# Patient Record
Sex: MALE | URBAN - METROPOLITAN AREA
[De-identification: names, ages, dates, MRNs, and addresses within clinical notes are randomized per-mention and may not be internally consistent; named-entity substitution may affect disease eponyms.]

---

## 2022-12-08 LAB — HBA1C MFR BLD HPLC: 10.6 %

## 2022-12-09 ENCOUNTER — TELEPHONE (OUTPATIENT)
Dept: ENDOCRINOLOGY | Age: 27
End: 2022-12-09

## 2023-03-22 VITALS
HEART RATE: 100 BPM | DIASTOLIC BLOOD PRESSURE: 78 MMHG | SYSTOLIC BLOOD PRESSURE: 122 MMHG | HEIGHT: 74 IN | BODY MASS INDEX: 32.47 KG/M2 | OXYGEN SATURATION: 96 % | WEIGHT: 253 LBS

## 2023-03-22 DIAGNOSIS — E66.9 CLASS 1 OBESITY: ICD-10-CM

## 2023-03-22 DIAGNOSIS — E10.9 TYPE 1 DIABETES MELLITUS WITHOUT COMPLICATIONS (HCC): Primary | ICD-10-CM

## 2023-03-22 PROBLEM — E66.811 CLASS 1 OBESITY: Status: ACTIVE | Noted: 2023-03-22

## 2023-03-22 RX ORDER — INSULIN GLARGINE 100 [IU]/ML
INJECTION, SOLUTION SUBCUTANEOUS
COMMUNITY
Start: 2023-02-09

## 2023-03-22 RX ORDER — INSULIN LISPRO 100 [IU]/ML
INJECTION, SOLUTION INTRAVENOUS; SUBCUTANEOUS
COMMUNITY
Start: 2023-01-31

## 2023-04-11 PROBLEM — E66.01 TYPE 1 DIABETES MELLITUS WITH MORBID OBESITY (HCC): Status: ACTIVE | Noted: 2023-04-11

## 2023-04-11 PROBLEM — E10.65 TYPE 1 DIABETES MELLITUS WITH HYPERGLYCEMIA (HCC): Status: ACTIVE | Noted: 2023-04-11

## 2023-04-11 PROBLEM — E10.69 TYPE 1 DIABETES MELLITUS WITH MORBID OBESITY (HCC): Status: ACTIVE | Noted: 2023-04-11

## 2023-04-11 PROBLEM — E10.69 DYSLIPIDEMIA DUE TO TYPE 1 DIABETES MELLITUS (HCC): Status: ACTIVE | Noted: 2023-04-11

## 2023-04-11 PROBLEM — E78.5 DYSLIPIDEMIA DUE TO TYPE 1 DIABETES MELLITUS (HCC): Status: ACTIVE | Noted: 2023-04-11

## 2023-05-05 LAB — DIABETIC RETINOPATHY: NEGATIVE

## 2023-05-08 PROBLEM — R03.0 ELEVATED BLOOD PRESSURE READING WITHOUT DIAGNOSIS OF HYPERTENSION: Status: ACTIVE | Noted: 2021-04-19

## 2023-05-09 ENCOUNTER — OFFICE VISIT (OUTPATIENT)
Dept: ENDOCRINOLOGY | Age: 28
End: 2023-05-09
Payer: COMMERCIAL

## 2023-05-09 VITALS
HEART RATE: 72 BPM | HEIGHT: 74 IN | BODY MASS INDEX: 31.57 KG/M2 | OXYGEN SATURATION: 97 % | DIASTOLIC BLOOD PRESSURE: 74 MMHG | SYSTOLIC BLOOD PRESSURE: 126 MMHG | WEIGHT: 246 LBS

## 2023-05-09 DIAGNOSIS — E10.65 TYPE 1 DIABETES MELLITUS WITH HYPERGLYCEMIA (HCC): Primary | ICD-10-CM

## 2023-05-09 DIAGNOSIS — E66.01 TYPE 1 DIABETES MELLITUS WITH MORBID OBESITY (HCC): ICD-10-CM

## 2023-05-09 DIAGNOSIS — E10.69 TYPE 1 DIABETES MELLITUS WITH MORBID OBESITY (HCC): ICD-10-CM

## 2023-05-09 DIAGNOSIS — E78.5 DYSLIPIDEMIA DUE TO TYPE 1 DIABETES MELLITUS (HCC): ICD-10-CM

## 2023-05-09 DIAGNOSIS — E10.69 DYSLIPIDEMIA DUE TO TYPE 1 DIABETES MELLITUS (HCC): ICD-10-CM

## 2023-05-09 PROCEDURE — 95251 CONT GLUC MNTR ANALYSIS I&R: CPT | Performed by: INTERNAL MEDICINE

## 2023-05-09 PROCEDURE — 99214 OFFICE O/P EST MOD 30 MIN: CPT | Performed by: INTERNAL MEDICINE

## 2023-05-09 RX ORDER — PROCHLORPERAZINE 25 MG/1
SUPPOSITORY RECTAL
Qty: 1 EACH | Refills: 3 | Status: SHIPPED | OUTPATIENT
Start: 2023-05-09

## 2023-05-09 RX ORDER — PROCHLORPERAZINE 25 MG/1
SUPPOSITORY RECTAL
Qty: 1 EACH | Refills: 0 | Status: SHIPPED | OUTPATIENT
Start: 2023-05-09

## 2023-05-09 RX ORDER — PROCHLORPERAZINE 25 MG/1
SUPPOSITORY RECTAL
Qty: 3 EACH | Refills: 5 | Status: SHIPPED | OUTPATIENT
Start: 2023-05-09

## 2023-05-09 NOTE — PROGRESS NOTES
sensation: normal  Pulses: normal, Vibration (128 Hz): Intact    Renal screen: check later   TSH screen: April 2023     2: HTN   Controlled     3: Hyperlipidemia   LDL: 132 , HDL: 36 , TGs: 114 - April 2023   Recheck in 6 months and consider statins if LDL > 100      4: type 1 Diab with obesity   Weight loss will help diabetes control     RTC in 6-8 weeks with A1C, lipids, MACR     EDUCATION:   Greater than 50% of this visit was spent in general counseling regarding obesity, diet, exercise, importance of adherence to insulin regime, recognition and treatment of hypo and hyperglycemia,  glucose logging, proper diabetes management, diabetic complications with poor management and the importance of glycemic control in order to avoid the complications of diabetes. Risks and potential complications of diabetes were reviewed with the patient. Diabetes health maintenance plan and follow-up were discussed and understood by the patient. We reviewed the importance of medication compliance and regular follow-up. Aggressive lifestyle modification was encouraged. Exercise Counselling: This patient is a candidate for regular physical exercise. Instructions to perform the following types of exercise:  Swimming or water aerobic exercise  Brisk walking  Playing tennis  Stationary bicycle or elliptical indoor  Low impact aerobic exercise    Instructions given to exercise for the following duration:  30 minutes a day for five-seven days per week.     Following instructions for being active throughout the day in addition to formal exercise:  Walk instead of drive whenever possible  Take the stairs instead of the elevator  Work in the garden  Park to the far end of the parking lot to add more walking steps to destination      Electronically signed by Rob Neal MD on 5/9/2023 at 11:47 AM

## 2023-05-09 NOTE — PATIENT INSTRUCTIONS
Change Basaglar to 45 units to am (to avoid overnight hypoglycemias)    Humalog I:CHO 1:5   Change Correction to 3 units for every 50 above 150     Correction scale                   With meals (during the day)   - at bedtime    < 150 ---     0 units                                 0 units  151-200 --  3 units                                 0 units  201-250 :    6 units                                 3 units  251-300:     9 units                                 6 units  301-350:     12 units                                 9 units  >350 :         15 units                                  12 units

## 2023-05-11 ENCOUNTER — TELEPHONE (OUTPATIENT)
Dept: ENDOCRINOLOGY | Age: 28
End: 2023-05-11

## 2023-07-05 ENCOUNTER — TELEMEDICINE (OUTPATIENT)
Dept: ENDOCRINOLOGY | Age: 28
End: 2023-07-05
Payer: COMMERCIAL

## 2023-07-05 DIAGNOSIS — E10.65 TYPE 1 DIABETES MELLITUS WITH HYPERGLYCEMIA (HCC): Primary | ICD-10-CM

## 2023-07-05 DIAGNOSIS — E10.69 DYSLIPIDEMIA DUE TO TYPE 1 DIABETES MELLITUS (HCC): ICD-10-CM

## 2023-07-05 DIAGNOSIS — E78.5 DYSLIPIDEMIA DUE TO TYPE 1 DIABETES MELLITUS (HCC): ICD-10-CM

## 2023-07-05 DIAGNOSIS — E10.69 TYPE 1 DIABETES MELLITUS WITH MORBID OBESITY (HCC): ICD-10-CM

## 2023-07-05 DIAGNOSIS — E66.01 TYPE 1 DIABETES MELLITUS WITH MORBID OBESITY (HCC): ICD-10-CM

## 2023-07-05 PROCEDURE — 95251 CONT GLUC MNTR ANALYSIS I&R: CPT | Performed by: INTERNAL MEDICINE

## 2023-07-05 PROCEDURE — 99214 OFFICE O/P EST MOD 30 MIN: CPT | Performed by: INTERNAL MEDICINE

## 2023-07-05 RX ORDER — INSULIN GLARGINE 100 [IU]/ML
INJECTION, SOLUTION SUBCUTANEOUS
Qty: 5 ADJUSTABLE DOSE PRE-FILLED PEN SYRINGE | Refills: 1 | Status: SHIPPED | OUTPATIENT
Start: 2023-07-05

## 2023-07-05 NOTE — PROGRESS NOTES
elliptical indoor  Low impact aerobic exercise    Instructions given to exercise for the following duration:  30 minutes a day for five-seven days per week.     Following instructions for being active throughout the day in addition to formal exercise:  Walk instead of drive whenever possible  Take the stairs instead of the elevator  Work in the garden  Park to the far end of the parking lot to add more walking steps to destination      Electronically signed by Bob Rendon MD on 7/5/2023 at 4:17 PM

## 2023-08-22 ENCOUNTER — OFFICE VISIT (OUTPATIENT)
Dept: ENDOCRINOLOGY | Age: 28
End: 2023-08-22
Payer: COMMERCIAL

## 2023-08-22 VITALS
WEIGHT: 246 LBS | DIASTOLIC BLOOD PRESSURE: 84 MMHG | HEIGHT: 74 IN | BODY MASS INDEX: 31.57 KG/M2 | HEART RATE: 97 BPM | OXYGEN SATURATION: 98 % | SYSTOLIC BLOOD PRESSURE: 123 MMHG

## 2023-08-22 DIAGNOSIS — E10.69 DYSLIPIDEMIA DUE TO TYPE 1 DIABETES MELLITUS (HCC): ICD-10-CM

## 2023-08-22 DIAGNOSIS — E10.65 TYPE 1 DIABETES MELLITUS WITH HYPERGLYCEMIA (HCC): Primary | ICD-10-CM

## 2023-08-22 DIAGNOSIS — E78.5 DYSLIPIDEMIA DUE TO TYPE 1 DIABETES MELLITUS (HCC): ICD-10-CM

## 2023-08-22 DIAGNOSIS — E66.01 TYPE 1 DIABETES MELLITUS WITH MORBID OBESITY (HCC): ICD-10-CM

## 2023-08-22 DIAGNOSIS — E10.65 TYPE 1 DIABETES MELLITUS WITH HYPERGLYCEMIA (HCC): ICD-10-CM

## 2023-08-22 DIAGNOSIS — E10.69 TYPE 1 DIABETES MELLITUS WITH MORBID OBESITY (HCC): ICD-10-CM

## 2023-08-22 LAB
CREAT UR-MCNC: 55.2 MG/DL (ref 39–259)
MICROALBUMIN UR DL<=1MG/L-MCNC: <1.2 MG/DL
MICROALBUMIN/CREAT UR: NORMAL MG/G (ref 0–30)

## 2023-08-22 PROCEDURE — 99214 OFFICE O/P EST MOD 30 MIN: CPT | Performed by: INTERNAL MEDICINE

## 2023-08-22 PROCEDURE — 95251 CONT GLUC MNTR ANALYSIS I&R: CPT | Performed by: INTERNAL MEDICINE

## 2023-08-22 NOTE — PROGRESS NOTES
every 2 weeks. Call for blood sugars less than 60 or more than 400. Eye exam: Last exam in 2022. Denies DR. His wife will schedule him. Foot exam:  May 2023   Deformity/amputation: absent  Skin lesions/pre-ulcerative calluses: absent  Edema: right- negative, left- negative  Sensory exam: Monofilament sensation: normal  Pulses: normal, Vibration (128 Hz): Intact    Renal screen: check later   TSH screen: April 2023     2: HTN   Controlled     3: Hyperlipidemia   LDL: 132 , HDL: 36 , TGs: 114 - April 2023   Recheck in 6 months and consider statins if LDL > 100      4: type 1 Diab with obesity   Weight loss will help diabetes control     RTC in 6-8 weeks    Labs provided in May 2023: Still not done, despite multiple reminders. He was stuppose to do the labs in May 2023. In July 2023 he said he will do  in a week. He will do labs today:  A1C, lipids, MACR     EDUCATION:   Greater than 50% of this visit was spent in general counseling regarding obesity, diet, exercise, importance of adherence to insulin regime, recognition and treatment of hypo and hyperglycemia,  glucose logging, proper diabetes management, diabetic complications with poor management and the importance of glycemic control in order to avoid the complications of diabetes. Risks and potential complications of diabetes were reviewed with the patient. Diabetes health maintenance plan and follow-up were discussed and understood by the patient. We reviewed the importance of medication compliance and regular follow-up. Aggressive lifestyle modification was encouraged. Exercise Counselling: This patient is a candidate for regular physical exercise.  Instructions to perform the following types of exercise:  Swimming or water aerobic exercise  Brisk walking  Playing tennis  Stationary bicycle or elliptical indoor  Low impact aerobic exercise    Instructions given to exercise for the following duration:  30 minutes a day for five-seven days per

## 2023-08-22 NOTE — PATIENT INSTRUCTIONS
Change Basaglar to 30 units to am (to avoid overnight hypoglycemias)    Humalog I:CHO 1:5. Take 3 units for black coffee to help with morris phenomenon   C/w Correction 3 units for every 50 above 150.    Correction scale                   With meals (during the day)   - at bedtime    < 150 ---     0 units                                 0 units  151-200 --  3 units                                 0 units  201-250 :    6 units                                 3 units  251-300:     9 units                                 6 units  301-350:     12 units                                 9 units  >350 :         15 units                                  12 units

## 2023-08-23 ENCOUNTER — TELEPHONE (OUTPATIENT)
Dept: ENDOCRINOLOGY | Age: 28
End: 2023-08-23

## 2023-08-23 DIAGNOSIS — E10.69 DYSLIPIDEMIA DUE TO TYPE 1 DIABETES MELLITUS (HCC): Primary | ICD-10-CM

## 2023-08-23 DIAGNOSIS — E78.5 DYSLIPIDEMIA DUE TO TYPE 1 DIABETES MELLITUS (HCC): Primary | ICD-10-CM

## 2023-08-23 DIAGNOSIS — E10.65 TYPE 1 DIABETES MELLITUS WITH HYPERGLYCEMIA (HCC): ICD-10-CM

## 2023-08-23 LAB
CHOLEST SERPL-MCNC: 197 MG/DL (ref 0–199)
EST. AVERAGE GLUCOSE BLD GHB EST-MCNC: 231.7 MG/DL
HBA1C MFR BLD: 9.7 %
HDLC SERPL-MCNC: 47 MG/DL (ref 40–60)
LDLC SERPL CALC-MCNC: 115 MG/DL
TRIGL SERPL-MCNC: 176 MG/DL (ref 0–150)
VLDLC SERPL CALC-MCNC: 35 MG/DL

## 2023-08-23 RX ORDER — ROSUVASTATIN CALCIUM 5 MG/1
5 TABLET, COATED ORAL NIGHTLY
Qty: 90 TABLET | Refills: 3 | Status: SHIPPED | OUTPATIENT
Start: 2023-08-23

## 2023-08-23 NOTE — TELEPHONE ENCOUNTER
Mr. Antonio German informed per Dr. Chevy Nicholson:    Please inform A1C is very high at 9.5%   Cholesterol number are better , still above target range.  Start crestor 5 mg daily

## 2023-08-23 NOTE — TELEPHONE ENCOUNTER
----- Message from Marvin Haddad MD sent at 8/23/2023  4:47 PM EDT -----  Please inform A1C is very high at 9.5%   Cholesterol number are better , still above target range.  Start crestor 5 mg daily

## 2023-10-04 ENCOUNTER — OFFICE VISIT (OUTPATIENT)
Dept: ENDOCRINOLOGY | Age: 28
End: 2023-10-04
Payer: COMMERCIAL

## 2023-10-04 VITALS
OXYGEN SATURATION: 98 % | HEART RATE: 92 BPM | SYSTOLIC BLOOD PRESSURE: 138 MMHG | DIASTOLIC BLOOD PRESSURE: 79 MMHG | WEIGHT: 245.8 LBS | BODY MASS INDEX: 31.54 KG/M2 | HEIGHT: 74 IN

## 2023-10-04 DIAGNOSIS — E10.69 DYSLIPIDEMIA DUE TO TYPE 1 DIABETES MELLITUS (HCC): ICD-10-CM

## 2023-10-04 DIAGNOSIS — E78.5 DYSLIPIDEMIA DUE TO TYPE 1 DIABETES MELLITUS (HCC): ICD-10-CM

## 2023-10-04 DIAGNOSIS — E10.69 TYPE 1 DIABETES MELLITUS WITH MORBID OBESITY (HCC): ICD-10-CM

## 2023-10-04 DIAGNOSIS — E66.01 TYPE 1 DIABETES MELLITUS WITH MORBID OBESITY (HCC): ICD-10-CM

## 2023-10-04 DIAGNOSIS — E10.65 TYPE 1 DIABETES MELLITUS WITH HYPERGLYCEMIA (HCC): Primary | ICD-10-CM

## 2023-10-04 PROCEDURE — 99214 OFFICE O/P EST MOD 30 MIN: CPT | Performed by: INTERNAL MEDICINE

## 2023-10-04 PROCEDURE — 3046F HEMOGLOBIN A1C LEVEL >9.0%: CPT | Performed by: INTERNAL MEDICINE

## 2023-10-04 PROCEDURE — 95251 CONT GLUC MNTR ANALYSIS I&R: CPT | Performed by: INTERNAL MEDICINE

## 2023-10-04 RX ORDER — INSULIN GLARGINE 100 [IU]/ML
INJECTION, SOLUTION SUBCUTANEOUS
Qty: 5 ADJUSTABLE DOSE PRE-FILLED PEN SYRINGE | Refills: 1 | Status: SHIPPED | OUTPATIENT
Start: 2023-10-04

## 2023-10-04 RX ORDER — PROCHLORPERAZINE 25 MG/1
SUPPOSITORY RECTAL
Qty: 3 EACH | Refills: 5 | Status: SHIPPED | OUTPATIENT
Start: 2023-10-04

## 2023-10-04 RX ORDER — PROCHLORPERAZINE 25 MG/1
SUPPOSITORY RECTAL
Qty: 1 EACH | Refills: 3 | Status: SHIPPED | OUTPATIENT
Start: 2023-10-04

## 2023-10-04 RX ORDER — PROCHLORPERAZINE 25 MG/1
SUPPOSITORY RECTAL
Qty: 1 EACH | Refills: 0 | Status: SHIPPED | OUTPATIENT
Start: 2023-10-04

## 2023-10-04 NOTE — PROGRESS NOTES
units  201-250 :    6 units                                 3 units  251-300:     9 units                                 6 units  301-350:     12 units                                 9 units  >350 :         15 units                                  12 units    Poor adherence to medical treatment      Rule of 15 for hypoglycemic episodes discussed     Has Glucagon at home     Will check price for dexcom. Sending again. If covered, connect for tandem pump   Will like to look in to pump if affordable . He has care source     All instructions provided in written. Check Blood sugars 3-4 times per day. Log them along with insulin and send them every 2 weeks. Call for blood sugars less than 60 or more than 400. Eye exam: Last exam in 2022. Denies DR. His wife scheduled the exam, it is coming up. Foot exam:  May 2023   Deformity/amputation: absent  Skin lesions/pre-ulcerative calluses: absent  Edema: right- negative, left- negative  Sensory exam: Monofilament sensation: normal  Pulses: normal, Vibration (128 Hz): Intact    Renal screen: cAug 2023   TSH screen: April 2023     2: HTN   Controlled     3: Hyperlipidemia   LDL: 115 < 132 , HDL: 47, TGs: 176 - Aug 2023 (before crestor 5 mg)   C/w crestor 5 mg daily       4: type 1 Diab with obesity   Weight loss will help diabetes control     RTC in 7-8 weeks      No labs today      EDUCATION:   Greater than 50% of this visit was spent in general counseling regarding obesity, diet, exercise, importance of adherence to insulin regime, recognition and treatment of hypo and hyperglycemia,  glucose logging, proper diabetes management, diabetic complications with poor management and the importance of glycemic control in order to avoid the complications of diabetes. Risks and potential complications of diabetes were reviewed with the patient. Diabetes health maintenance plan and follow-up were discussed and understood by the patient.   We reviewed the importance of

## 2023-10-04 NOTE — PATIENT INSTRUCTIONS
Change Basaglar to 32 units to am    Humalog for coffee 5 units in am   Humalog I:CHO 1:4.    C/w Correction 3 units for every 50 above 150.  May cut down to 2 units for each 50 above 150      Correction scale                   With meals (during the day)   - at bedtime    < 150 ---     0 units                                 0 units  151-200 --  3 units                                 0 units  201-250 :    6 units                                 3 units  251-300:     9 units                                 6 units  301-350:     12 units                                 9 units  >350 :         15 units                                  12 units

## 2024-01-11 ENCOUNTER — TELEPHONE (OUTPATIENT)
Dept: ENDOCRINOLOGY | Age: 29
End: 2024-01-11

## 2024-01-11 NOTE — TELEPHONE ENCOUNTER
Call from pt's spouse stating that insurance is requesting a PA for Dexcom G6 sensor, and transmitter     Please advise

## 2024-01-12 ENCOUNTER — TELEPHONE (OUTPATIENT)
Dept: ENDOCRINOLOGY | Age: 29
End: 2024-01-12

## 2024-01-12 NOTE — TELEPHONE ENCOUNTER
Submitted PA for Dexcom   Via CMM Key: [id number]   STATUS: Approved today  Approved 1/1/2024-1/11/2025  PA NUMBER: [id number]

## 2024-01-12 NOTE — TELEPHONE ENCOUNTER
Submitted PA for Dexcom Transmitter  Via CMM Key: [id number]   STATUS:  Approved today  Approved 1/1/2024-1/11/2025  PA NUMBER: [id number]

## 2024-01-12 NOTE — TELEPHONE ENCOUNTER
Submitted PA for Dexcom Sensor  Via CMM Key: [id number]   STATUS: Approved today  Approved 1/1/2024-01/11/2025  PA Number: [id number]

## 2024-01-19 RX ORDER — INSULIN LISPRO 100 [IU]/ML
INJECTION, SOLUTION INTRAVENOUS; SUBCUTANEOUS
Qty: 5 ADJUSTABLE DOSE PRE-FILLED PEN SYRINGE | Refills: 0 | Status: SHIPPED | OUTPATIENT
Start: 2024-01-19

## 2024-01-23 ENCOUNTER — OFFICE VISIT (OUTPATIENT)
Dept: ENDOCRINOLOGY | Age: 29
End: 2024-01-23

## 2024-01-23 VITALS
SYSTOLIC BLOOD PRESSURE: 135 MMHG | OXYGEN SATURATION: 98 % | HEART RATE: 88 BPM | HEIGHT: 75 IN | DIASTOLIC BLOOD PRESSURE: 84 MMHG | WEIGHT: 254 LBS | BODY MASS INDEX: 31.58 KG/M2

## 2024-01-23 DIAGNOSIS — E10.69 DYSLIPIDEMIA DUE TO TYPE 1 DIABETES MELLITUS (HCC): ICD-10-CM

## 2024-01-23 DIAGNOSIS — E10.69 TYPE 1 DIABETES MELLITUS WITH MORBID OBESITY (HCC): ICD-10-CM

## 2024-01-23 DIAGNOSIS — E10.65 TYPE 1 DIABETES MELLITUS WITH HYPERGLYCEMIA (HCC): ICD-10-CM

## 2024-01-23 DIAGNOSIS — E66.01 TYPE 1 DIABETES MELLITUS WITH MORBID OBESITY (HCC): ICD-10-CM

## 2024-01-23 DIAGNOSIS — E10.65 TYPE 1 DIABETES MELLITUS WITH HYPERGLYCEMIA (HCC): Primary | ICD-10-CM

## 2024-01-23 DIAGNOSIS — E78.5 DYSLIPIDEMIA DUE TO TYPE 1 DIABETES MELLITUS (HCC): ICD-10-CM

## 2024-01-23 LAB
ALBUMIN SERPL-MCNC: 4.5 G/DL (ref 3.4–5)
ALBUMIN/GLOB SERPL: 2 {RATIO} (ref 1.1–2.2)
ALP SERPL-CCNC: 85 U/L (ref 40–129)
ALT SERPL-CCNC: 16 U/L (ref 10–40)
ANION GAP SERPL CALCULATED.3IONS-SCNC: 13 MMOL/L (ref 3–16)
AST SERPL-CCNC: 16 U/L (ref 15–37)
BILIRUB SERPL-MCNC: 0.5 MG/DL (ref 0–1)
BUN SERPL-MCNC: 12 MG/DL (ref 7–20)
CALCIUM SERPL-MCNC: 9.3 MG/DL (ref 8.3–10.6)
CHLORIDE SERPL-SCNC: 99 MMOL/L (ref 99–110)
CHOLEST SERPL-MCNC: 208 MG/DL (ref 0–199)
CO2 SERPL-SCNC: 27 MMOL/L (ref 21–32)
CREAT SERPL-MCNC: 0.8 MG/DL (ref 0.9–1.3)
CREAT UR-MCNC: 150.6 MG/DL (ref 39–259)
GFR SERPLBLD CREATININE-BSD FMLA CKD-EPI: >60 ML/MIN/{1.73_M2}
GLUCOSE SERPL-MCNC: 141 MG/DL (ref 70–99)
HDLC SERPL-MCNC: 58 MG/DL (ref 40–60)
LDL CHOLESTEROL CALCULATED: 137 MG/DL
MICROALBUMIN UR DL<=1MG/L-MCNC: <1.2 MG/DL
MICROALBUMIN/CREAT UR: NORMAL MG/G (ref 0–30)
POTASSIUM SERPL-SCNC: 4.3 MMOL/L (ref 3.5–5.1)
PROT SERPL-MCNC: 6.8 G/DL (ref 6.4–8.2)
SODIUM SERPL-SCNC: 139 MMOL/L (ref 136–145)
TRIGL SERPL-MCNC: 63 MG/DL (ref 0–150)
VLDLC SERPL CALC-MCNC: 13 MG/DL

## 2024-01-23 RX ORDER — PROCHLORPERAZINE 25 MG/1
SUPPOSITORY RECTAL
Qty: 3 EACH | Refills: 5 | Status: SHIPPED | OUTPATIENT
Start: 2024-01-23

## 2024-01-23 RX ORDER — PROCHLORPERAZINE 25 MG/1
SUPPOSITORY RECTAL
Qty: 1 EACH | Refills: 3 | Status: SHIPPED | OUTPATIENT
Start: 2024-01-23

## 2024-01-23 RX ORDER — INSULIN LISPRO 200 [IU]/ML
INJECTION, SOLUTION SUBCUTANEOUS
Qty: 5 ADJUSTABLE DOSE PRE-FILLED PEN SYRINGE | Refills: 3 | Status: SHIPPED | OUTPATIENT
Start: 2024-01-23 | End: 2024-01-24 | Stop reason: SDUPTHER

## 2024-01-23 RX ORDER — INSULIN PMP CART,AUT,G6/7,CNTR
EACH SUBCUTANEOUS
Qty: 10 EACH | Refills: 2 | Status: SHIPPED | OUTPATIENT
Start: 2024-01-23

## 2024-01-23 RX ORDER — INSULIN PMP CART,AUT,G6/7,CNTR
EACH SUBCUTANEOUS
Qty: 1 KIT | Refills: 0 | Status: SHIPPED | OUTPATIENT
Start: 2024-01-23

## 2024-01-23 RX ORDER — PROCHLORPERAZINE 25 MG/1
SUPPOSITORY RECTAL
Qty: 1 EACH | Refills: 0 | Status: SHIPPED | OUTPATIENT
Start: 2024-01-23

## 2024-01-23 NOTE — PROGRESS NOTES
order to avoid the complications of diabetes.    Risks and potential complications of diabetes were reviewed with the patient.  Diabetes health maintenance plan and follow-up were discussed and understood by the patient.  We reviewed the importance of medication compliance and regular follow-up.   Aggressive lifestyle modification was encouraged.     Exercise Counselling:  This patient is a candidate for regular physical exercise. Instructions to perform the following types of exercise:  Swimming or water aerobic exercise  Brisk walking  Playing tennis  Stationary bicycle or elliptical indoor  Low impact aerobic exercise    Instructions given to exercise for the following duration:  30 minutes a day for five-seven days per week.    Following instructions for being active throughout the day in addition to formal exercise:  Walk instead of drive whenever possible  Take the stairs instead of the elevator  Work in the garden  Park to the far end of the parking lot to add more walking steps to destination      Electronically signed by PATRICK JOHNSTON MD on 1/23/2024 at 8:35 AM

## 2024-01-24 ENCOUNTER — TELEPHONE (OUTPATIENT)
Dept: ENDOCRINOLOGY | Age: 29
End: 2024-01-24

## 2024-01-24 DIAGNOSIS — E78.5 DYSLIPIDEMIA DUE TO TYPE 1 DIABETES MELLITUS (HCC): ICD-10-CM

## 2024-01-24 DIAGNOSIS — E10.69 DYSLIPIDEMIA DUE TO TYPE 1 DIABETES MELLITUS (HCC): ICD-10-CM

## 2024-01-24 LAB
EST. AVERAGE GLUCOSE BLD GHB EST-MCNC: 243.2 MG/DL
HBA1C MFR BLD: 10.1 %

## 2024-01-24 RX ORDER — INSULIN LISPRO 200 [IU]/ML
INJECTION, SOLUTION SUBCUTANEOUS
Qty: 15 ADJUSTABLE DOSE PRE-FILLED PEN SYRINGE | Refills: 1 | Status: SHIPPED | OUTPATIENT
Start: 2024-01-24

## 2024-01-24 RX ORDER — INSULIN LISPRO 200 [IU]/ML
INJECTION, SOLUTION SUBCUTANEOUS
Qty: 15 ADJUSTABLE DOSE PRE-FILLED PEN SYRINGE | Refills: 1 | Status: SHIPPED | OUTPATIENT
Start: 2024-01-24 | End: 2024-01-24 | Stop reason: SDUPTHER

## 2024-01-24 RX ORDER — ROSUVASTATIN CALCIUM 10 MG/1
10 TABLET, COATED ORAL DAILY
Qty: 90 TABLET | Refills: 3 | Status: SHIPPED | OUTPATIENT
Start: 2024-01-24

## 2024-01-24 NOTE — TELEPHONE ENCOUNTER
Mrs. Gonzalez informed Rx sent for Freestyle Bridger 2 sensors # 2  Also refill sent per her request for 90 day supply of Humalog U 200

## 2024-01-24 NOTE — TELEPHONE ENCOUNTER
Mr. Gonzalez informed of results.  [employer]pod rep has already made contact with him.  RX for Dexcom sent in today.

## 2024-01-24 NOTE — TELEPHONE ENCOUNTER
----- Message from Darío Tapia MD sent at 1/24/2024 11:40 AM EST -----  Please inform cholesterol levels are high , change rosuvastatin to 10 mg daily. Do not miss it .   A1C is very high at 10.7%. lets work on getting pump. Please contact [employer] rep for him   Other labs are okay

## 2024-01-24 NOTE — TELEPHONE ENCOUNTER
Call from pt's spouse estevan stating that the pt cannot afford to pay for the Dexcom G6 sensors at this time     Estevan is wanting to know if Dr. Tapia could send in a script for the Freestyle nany 2 sensor for now     Please advise

## 2024-02-19 ENCOUNTER — TELEPHONE (OUTPATIENT)
Dept: ENDOCRINOLOGY | Age: 29
End: 2024-02-19

## 2024-02-19 NOTE — TELEPHONE ENCOUNTER
Mrs. Gonzalez informed she is not on Mr. Gonzalez HIPAA form and I am unable to discuss anything with her.    She will have Mr. Gonzalez contact the office.  RX was sent to his pharmacy for 3 boxes of Humalog Kwikpen 1/2024 with refills.  Please advise if he was requesting a different RX

## 2024-02-19 NOTE — TELEPHONE ENCOUNTER
Pt wife calling and states the pt is needing more insulin on his rx for his Humalog Kwikpen. He needs1 box per month. She states the Dr that last time was going to try to find a different kind that held more but the pt does not want that kind, wants to stick w/ the 1 box per month    [facility] [place] (new pharmacy)  W[facility] [address] [facility] [address]      Dianna Gonzalez (Spouse)  [phone] (Home Phone)

## 2024-02-20 RX ORDER — INSULIN LISPRO 200 [IU]/ML
INJECTION, SOLUTION SUBCUTANEOUS
Qty: 15 ADJUSTABLE DOSE PRE-FILLED PEN SYRINGE | Refills: 1 | Status: SHIPPED | OUTPATIENT
Start: 2024-02-20

## 2024-02-20 NOTE — TELEPHONE ENCOUNTER
Pt calling back and states the 15ml Humalog Kwikpen is what he is needing. He states he takes about 50-60 units per day    [facility] [address] [facility] in [place]    CB#  [phone] (Mobile)

## 2024-02-21 NOTE — TELEPHONE ENCOUNTER
Mr. Gonzalez stated his plan requires PA for Humalog U-200 due to dose increase.  He is almost out of insulin. Will  a sample pen in the morning.

## 2024-02-22 ENCOUNTER — TELEPHONE (OUTPATIENT)
Dept: ENDOCRINOLOGY | Age: 29
End: 2024-02-22

## 2024-02-22 RX ORDER — INSULIN LISPRO 200 [IU]/ML
INJECTION, SOLUTION SUBCUTANEOUS
Qty: 2 ADJUSTABLE DOSE PRE-FILLED PEN SYRINGE | Refills: 3 | Status: SHIPPED | COMMUNITY
Start: 2024-02-22

## 2024-02-22 NOTE — TELEPHONE ENCOUNTER
Submitted PA for Humalog  [facility]ia [facility] Key: [id number] STATUS: PENDING.    Follow up done daily; if no decision with in three days we will refax.  If another three days goes by with no decision will call the insurance for status.

## 2024-02-23 NOTE — TELEPHONE ENCOUNTER
This request has been approved.  Service Requested: Humalog Kwikpen U-200  Approval dates: 01/23/2024-02/23/2025  Prior Auth Number: [id number]

## 2024-02-23 NOTE — TELEPHONE ENCOUNTER
[facility] calling to speak w/ someone in PA dept regarding PA for Humalog     # [phone]   Case # [id number]

## 2024-02-27 NOTE — TELEPHONE ENCOUNTER
Mr. Gonzalez informed :    This request has been approved.  Service Requested: Humalog Kwikpen U-200  Approval dates: 01/23/2024-02/23/2025  Prior Auth Number: [id number]

## 2024-03-01 ENCOUNTER — TELEPHONE (OUTPATIENT)
Dept: ENDOCRINOLOGY | Age: 29
End: 2024-03-01

## 2024-03-01 NOTE — TELEPHONE ENCOUNTER
Call from pt requesting to speak April regarding his Humalog insulin     Pt stated that his insurance approved the 3 boxes but they wont approve the 15     Please advise   CB# [phone]

## 2024-03-04 NOTE — TELEPHONE ENCOUNTER
Mail box is full. Will try Mr. Gonzalez again tomorrow .  Called [facility] and spoke with Tyrone in the pharmacy who stated the plan would only cover 5 pens. Tyrone ran the RX through again while on the phone and the RX went through. He will get the two boxes ready for Mr. Gonzalez to  this evening.

## 2024-03-05 NOTE — TELEPHONE ENCOUNTER
Mail box is full. Will try Mr. Gonzalez again tomorrow .  Called [facility] and spoke with Tyrone in the pharmacy who stated the plan would only cover 5 pens. Tyrone ran the RX through again while on the phone and the RX went through. He will get the two boxes ready for Mr. Gonzalez to  this evening.

## 2024-04-17 ENCOUNTER — TELEPHONE (OUTPATIENT)
Dept: ENDOCRINOLOGY | Age: 29
End: 2024-04-17

## 2024-04-17 RX ORDER — INSULIN ASPART INJECTION 100 [IU]/ML
INJECTION, SOLUTION SUBCUTANEOUS
Qty: 2 ADJUSTABLE DOSE PRE-FILLED PEN SYRINGE | Refills: 0 | Status: SHIPPED | COMMUNITY
Start: 2024-04-17

## 2024-04-17 NOTE — TELEPHONE ENCOUNTER
Patient is out of insulin lispro as of this morning and cannot refill until the 21st. He is asking if there are any short acting sample pens that he can  in [place]. Please advise.

## 2024-05-09 ENCOUNTER — OFFICE VISIT (OUTPATIENT)
Dept: ENDOCRINOLOGY | Age: 29
End: 2024-05-09

## 2024-05-09 VITALS
HEART RATE: 86 BPM | SYSTOLIC BLOOD PRESSURE: 119 MMHG | WEIGHT: 257.2 LBS | BODY MASS INDEX: 31.98 KG/M2 | OXYGEN SATURATION: 98 % | HEIGHT: 75 IN | DIASTOLIC BLOOD PRESSURE: 82 MMHG

## 2024-05-09 DIAGNOSIS — E78.5 DYSLIPIDEMIA DUE TO TYPE 1 DIABETES MELLITUS (HCC): ICD-10-CM

## 2024-05-09 DIAGNOSIS — E10.69 TYPE 1 DIABETES MELLITUS WITH MORBID OBESITY (HCC): ICD-10-CM

## 2024-05-09 DIAGNOSIS — E66.01 TYPE 1 DIABETES MELLITUS WITH MORBID OBESITY (HCC): ICD-10-CM

## 2024-05-09 DIAGNOSIS — E10.69 DYSLIPIDEMIA DUE TO TYPE 1 DIABETES MELLITUS (HCC): ICD-10-CM

## 2024-05-09 DIAGNOSIS — E10.65 TYPE 1 DIABETES MELLITUS WITH HYPERGLYCEMIA (HCC): Primary | ICD-10-CM

## 2024-05-09 DIAGNOSIS — E10.65 TYPE 1 DIABETES MELLITUS WITH HYPERGLYCEMIA (HCC): ICD-10-CM

## 2024-05-09 RX ORDER — BLOOD-GLUCOSE SENSOR
EACH MISCELLANEOUS
Qty: 2 EACH | Refills: 5 | Status: SHIPPED | OUTPATIENT
Start: 2024-05-09

## 2024-05-09 NOTE — PROGRESS NOTES
HTN   Controlled     3: Hyperlipidemia   LDL: 137 < 115 < 132 , HDL: 58, TGs: 63 - Jan 2024 (on crestor 5 mg)   C/w crestor 10 mg daily     He said he is taking it. On med rec, I saw he has not picked up On asking again, he admitted he is not taking it   He said he will  today     4: type 1 Diab with obesity   Weight loss will help diabetes control     RTC in 3 months     A1C today        EDUCATION:   Greater than 50% of this visit was spent in general counseling regarding obesity, diet, exercise, importance of adherence to insulin regime, recognition and treatment of hypo and hyperglycemia,  glucose logging, proper diabetes management, diabetic complications with poor management and the importance of glycemic control in order to avoid the complications of diabetes.    Risks and potential complications of diabetes were reviewed with the patient.  Diabetes health maintenance plan and follow-up were discussed and understood by the patient.  We reviewed the importance of medication compliance and regular follow-up.   Aggressive lifestyle modification was encouraged.     Exercise Counselling:  This patient is a candidate for regular physical exercise. Instructions to perform the following types of exercise:  Swimming or water aerobic exercise  Brisk walking  Playing tennis  Stationary bicycle or elliptical indoor  Low impact aerobic exercise    Instructions given to exercise for the following duration:  30 minutes a day for five-seven days per week.    Following instructions for being active throughout the day in addition to formal exercise:  Walk instead of drive whenever possible  Take the stairs instead of the elevator  Work in the garden  Park to the far end of the parking lot to add more walking steps to destination      Electronically signed by PATRICK JOHNSTON MD on 5/9/2024 at 10:58 AM

## 2024-05-10 ENCOUNTER — TELEPHONE (OUTPATIENT)
Dept: ENDOCRINOLOGY | Age: 29
End: 2024-05-10

## 2024-05-10 DIAGNOSIS — E10.65 TYPE 1 DIABETES MELLITUS WITH HYPERGLYCEMIA (HCC): Primary | ICD-10-CM

## 2024-05-10 LAB
EST. AVERAGE GLUCOSE BLD GHB EST-MCNC: 234.6 MG/DL
HBA1C MFR BLD: 9.8 %

## 2024-05-10 RX ORDER — INSULIN LISPRO 200 [IU]/ML
INJECTION, SOLUTION SUBCUTANEOUS
Qty: 20 ADJUSTABLE DOSE PRE-FILLED PEN SYRINGE | Refills: 1 | Status: SHIPPED | OUTPATIENT
Start: 2024-05-10

## 2024-05-10 NOTE — TELEPHONE ENCOUNTER
----- Message from Darío Tapia MD sent at 5/10/2024  8:12 AM EDT -----  Please inform A1C is high at 9.8%

## 2024-05-10 NOTE — TELEPHONE ENCOUNTER
Mr. Gonzalez informed of A1C result.   Mr. Gonzalez stated he has been close to running out of his insulin.  He would like to see if increase can be made to the RX so he is sure not to run out.

## 2024-08-15 ENCOUNTER — TELEMEDICINE (OUTPATIENT)
Dept: ENDOCRINOLOGY | Age: 29
End: 2024-08-15

## 2024-08-15 DIAGNOSIS — E78.5 DYSLIPIDEMIA DUE TO TYPE 1 DIABETES MELLITUS (HCC): ICD-10-CM

## 2024-08-15 DIAGNOSIS — E66.01 TYPE 1 DIABETES MELLITUS WITH MORBID OBESITY (HCC): ICD-10-CM

## 2024-08-15 DIAGNOSIS — E10.69 DYSLIPIDEMIA DUE TO TYPE 1 DIABETES MELLITUS (HCC): ICD-10-CM

## 2024-08-15 DIAGNOSIS — E10.65 TYPE 1 DIABETES MELLITUS WITH HYPERGLYCEMIA (HCC): Primary | ICD-10-CM

## 2024-08-15 DIAGNOSIS — E10.69 TYPE 1 DIABETES MELLITUS WITH MORBID OBESITY (HCC): ICD-10-CM

## 2024-08-15 RX ORDER — INSULIN GLARGINE 100 [IU]/ML
INJECTION, SOLUTION SUBCUTANEOUS
Qty: 5 ADJUSTABLE DOSE PRE-FILLED PEN SYRINGE | Refills: 3 | Status: SHIPPED | OUTPATIENT
Start: 2024-08-15

## 2024-08-15 NOTE — PROGRESS NOTES
started yet     Denies chest pain, exertional dyspnea.     Family history of CAD: None   Denies smoking/ alcohol     The following portions of the patient's history were reviewed and updated as appropriate:       Family History   Problem Relation Age of Onset    No Known Problems Mother     No Known Problems Father     No Known Problems Sister     No Known Problems Sister     No Known Problems Sister     No Known Problems Sister     Breast Cancer Maternal Grandmother     Diabetes type 2  Paternal Grandmother     Prostate Cancer Paternal Grandfather           Social History     Socioeconomic History    Marital status: Unknown     Spouse name: Not on file    Number of children: Not on file    Years of education: Not on file    Highest education level: Not on file   Occupational History    Not on file   Tobacco Use    Smoking status: Never    Smokeless tobacco: Never   Vaping Use    Vaping status: Never Used   Substance and Sexual Activity    Alcohol use: Never    Drug use: Never    Sexual activity: Yes   Other Topics Concern    Not on file   Social History Narrative    Not on file     Social Determinants of Health     Financial Resource Strain: Not on file   Food Insecurity: Not on file   Transportation Needs: Not on file   Physical Activity: Not on file   Stress: Not on file   Social Connections: Not on file   Intimate Partner Violence: Not on file   Housing Stability: Not on file        Past Medical History:   Diagnosis Date    Class 1 obesity due to excess calories with serious comorbidity in adult     Type 1 diabetes mellitus without complications (HCC)         No past surgical history on file.     No Known Allergies       Current Outpatient Medications:     insulin lispro (HUMALOG KWIKPEN) 200 UNIT/ML SOPN pen, Please provide 4 boxes - max daily dose 65 units per day, Disp: 20 Adjustable Dose Pre-filled Pen Syringe, Rfl: 1    Continuous Glucose Sensor (FREESTYLE MATEUS 3 SENSOR) [facility], Change every 2 weeks, Disp: 2

## 2024-08-15 NOTE — PATIENT INSTRUCTIONS
Change Basaglar to 30 units to am (to avoid overnight hypoglycemias)    Humalog for coffee 5 units in am   Humalog I:CHO 1:5.    Change Correction 2 units for every 50 above 150

## 2024-11-15 DIAGNOSIS — E10.65 TYPE 1 DIABETES MELLITUS WITH HYPERGLYCEMIA (HCC): ICD-10-CM

## 2024-11-18 RX ORDER — INSULIN LISPRO 200 [IU]/ML
INJECTION, SOLUTION SUBCUTANEOUS
Refills: 1 | OUTPATIENT
Start: 2024-11-18

## 2024-11-20 ENCOUNTER — TELEPHONE (OUTPATIENT)
Dept: ENDOCRINOLOGY | Age: 29
End: 2024-11-20

## 2024-11-20 DIAGNOSIS — E10.65 TYPE 1 DIABETES MELLITUS WITH HYPERGLYCEMIA (HCC): ICD-10-CM

## 2024-11-20 RX ORDER — INSULIN LISPRO 200 [IU]/ML
INJECTION, SOLUTION SUBCUTANEOUS
Qty: 20 ADJUSTABLE DOSE PRE-FILLED PEN SYRINGE | Refills: 1 | Status: SHIPPED | OUTPATIENT
Start: 2024-11-20

## 2024-11-20 NOTE — TELEPHONE ENCOUNTER
Pt asking for refill please send to pharmacy below         insulin lispro (HUMALOG KWIKPEN) 200 UNIT/ML SOPN pen     [facility]/pharmacy #[id number] - [place], [place] - [address] -  [phone] - F [phone]  [address] [address]  Phone: [phone]  Fax: [phone]

## 2024-11-21 ENCOUNTER — TELEMEDICINE (OUTPATIENT)
Dept: ENDOCRINOLOGY | Age: 29
End: 2024-11-21

## 2024-11-21 DIAGNOSIS — E10.65 TYPE 1 DIABETES MELLITUS WITH HYPERGLYCEMIA (HCC): Primary | ICD-10-CM

## 2024-11-21 DIAGNOSIS — E10.69 TYPE 1 DIABETES MELLITUS WITH MORBID OBESITY (HCC): ICD-10-CM

## 2024-11-21 DIAGNOSIS — E78.5 DYSLIPIDEMIA DUE TO TYPE 1 DIABETES MELLITUS (HCC): ICD-10-CM

## 2024-11-21 DIAGNOSIS — E10.69 DYSLIPIDEMIA DUE TO TYPE 1 DIABETES MELLITUS (HCC): ICD-10-CM

## 2024-11-21 DIAGNOSIS — E66.01 TYPE 1 DIABETES MELLITUS WITH MORBID OBESITY (HCC): ICD-10-CM

## 2024-11-21 NOTE — PROGRESS NOTES
Patient ID:   Wisam Gonzalez is a 29 y.o. male     Chief Complaint:   Wisam Gonzalez presents for an evaluation of Type 1 Diabetes Mellitus , Hyperlipidemia and hypertension.       Wisam Gonzalez, was evaluated through a synchronous (real-time) audio-video encounter. The patient (or guardian if applicable) is aware that this is a billable service, which includes applicable co-pays. This Virtual Visit was conducted with patient's (and/or legal guardian's) consent. Patient identification was verified, and a caregiver was present when appropriate.   The patient was located at Home: [address]  [place] [address]  Provider was located at Facility (Appt Dept): [address]  [address]  [place] [address]  Confirm you are appropriately licensed, registered, or certified to deliver care in the state where the patient is located as indicated above. If you are not or unsure, please re-schedule the visit: Yes, I confirm.     Subjective:   Type 1 Diabetes Mellitus diagnosed at age 21.   On insulin since diagnosis   Moved from [place] late 2022 . I connected to  physicians but no data available today (not synced? )    Pumps are expensive     Losing weight with less carbs and more active     Basaglar 30 units in am. 38 units caused morning or overnight hypoglycemias) .missing once a week and then takes in the evening.    Humalog I:CHO 4-5 units for each 15 grams. Taking 30-40 < 50-60 units in a day  . Taking 3 units for coffee   Correction : 3 units instead of 2 units for every 50 above 150       Feel comfortable counting carbs   Has glucagon at home     Using nany. Fell off a couple of times     Dexcom is expensive      Checks blood sugars 4 times per day. Reviewed/Reported  AM:   Lunch:  Supper:   HS:     Hypoglycemias: once a week. Sometimes in the afternoon and sometimes at night.     Meals: Three, may skip breakfast. Lunch and dinner are same size. Snacks 2 per day (granola bar, chips, pretzels). Drinks coke zero

## 2024-11-22 ENCOUNTER — TELEPHONE (OUTPATIENT)
Dept: ENDOCRINOLOGY | Age: 29
End: 2024-11-22

## 2025-02-05 NOTE — TELEPHONE ENCOUNTER
Requested Prescriptions     Pending Prescriptions Disp Refills    Continuous Glucose Sensor (FREESTYLE MATEUS 2 SENSOR) MISC [Pharmacy Med Name: FREESTYLE MATEUS 2 SENSOR]  5     Sig: CHANGE EVERY 14 DAYS     Lov: VV   Called to schedule follow up - no answer and VM is full.

## 2025-02-06 NOTE — TELEPHONE ENCOUNTER
Pt called back, pt is scheduled on 3/5/25    Rx- FreestCarbay nany 2 sensor    Pharmacy- [facility] pharmacy

## 2025-03-20 ENCOUNTER — TELEPHONE (OUTPATIENT)
Dept: ENDOCRINOLOGY | Age: 30
End: 2025-03-20

## 2025-03-20 NOTE — TELEPHONE ENCOUNTER
Patient called requesting a refill     Rx- insulin lispro (HUMALOG KWIKPEN) 200 UNIT/ML SOPN pen    Pharmacy- [facility]/pharmacy #[id number]    LOV-  NOV- 4/9/25    Please advise

## 2025-03-21 ENCOUNTER — OFFICE VISIT (OUTPATIENT)
Dept: ENDOCRINOLOGY | Age: 30
End: 2025-03-21

## 2025-03-21 VITALS
WEIGHT: 250.2 LBS | SYSTOLIC BLOOD PRESSURE: 132 MMHG | BODY MASS INDEX: 31.11 KG/M2 | OXYGEN SATURATION: 98 % | HEIGHT: 75 IN | DIASTOLIC BLOOD PRESSURE: 79 MMHG | HEART RATE: 76 BPM

## 2025-03-21 DIAGNOSIS — E78.5 DYSLIPIDEMIA DUE TO TYPE 1 DIABETES MELLITUS (HCC): ICD-10-CM

## 2025-03-21 DIAGNOSIS — E66.01 TYPE 1 DIABETES MELLITUS WITH MORBID OBESITY: ICD-10-CM

## 2025-03-21 DIAGNOSIS — E10.65 TYPE 1 DIABETES MELLITUS WITH HYPERGLYCEMIA (HCC): ICD-10-CM

## 2025-03-21 DIAGNOSIS — E10.69 TYPE 1 DIABETES MELLITUS WITH MORBID OBESITY: ICD-10-CM

## 2025-03-21 DIAGNOSIS — E10.69 DYSLIPIDEMIA DUE TO TYPE 1 DIABETES MELLITUS (HCC): ICD-10-CM

## 2025-03-21 LAB
ALBUMIN SERPL-MCNC: 4.5 G/DL (ref 3.4–5)
ALBUMIN/GLOB SERPL: 2.3 {RATIO} (ref 1.1–2.2)
ALP SERPL-CCNC: 82 U/L (ref 40–129)
ALT SERPL-CCNC: 26 U/L (ref 10–40)
ANION GAP SERPL CALCULATED.3IONS-SCNC: 9 MMOL/L (ref 3–16)
AST SERPL-CCNC: 20 U/L (ref 15–37)
BILIRUB SERPL-MCNC: 0.4 MG/DL (ref 0–1)
BUN SERPL-MCNC: 17 MG/DL (ref 7–20)
CALCIUM SERPL-MCNC: 9.1 MG/DL (ref 8.3–10.6)
CHLORIDE SERPL-SCNC: 102 MMOL/L (ref 99–110)
CHOLEST SERPL-MCNC: 252 MG/DL (ref 0–199)
CO2 SERPL-SCNC: 27 MMOL/L (ref 21–32)
CREAT SERPL-MCNC: 0.9 MG/DL (ref 0.9–1.3)
CREAT UR-MCNC: 106 MG/DL (ref 39–259)
EST. AVERAGE GLUCOSE BLD GHB EST-MCNC: 243.2 MG/DL
GFR SERPLBLD CREATININE-BSD FMLA CKD-EPI: >90 ML/MIN/{1.73_M2}
GLUCOSE SERPL-MCNC: 188 MG/DL (ref 70–99)
HBA1C MFR BLD: 10.1 %
HDLC SERPL-MCNC: 59 MG/DL (ref 40–60)
LDL CHOLESTEROL: 179 MG/DL
MICROALBUMIN UR DL<=1MG/L-MCNC: <1.2 MG/DL
MICROALBUMIN/CREAT UR: NORMAL MG/G (ref 0–30)
POTASSIUM SERPL-SCNC: 4.3 MMOL/L (ref 3.5–5.1)
PROT SERPL-MCNC: 6.5 G/DL (ref 6.4–8.2)
SODIUM SERPL-SCNC: 138 MMOL/L (ref 136–145)
TRIGL SERPL-MCNC: 68 MG/DL (ref 0–150)
VLDLC SERPL CALC-MCNC: 14 MG/DL

## 2025-03-21 RX ORDER — INSULIN LISPRO 200 [IU]/ML
INJECTION, SOLUTION SUBCUTANEOUS
Qty: 20 ADJUSTABLE DOSE PRE-FILLED PEN SYRINGE | Refills: 1 | Status: SHIPPED | OUTPATIENT
Start: 2025-03-21

## 2025-03-21 RX ORDER — HYDROCHLOROTHIAZIDE 12.5 MG/1
CAPSULE ORAL
Qty: 6 EACH | Refills: 1 | Status: SHIPPED | OUTPATIENT
Start: 2025-03-21

## 2025-03-21 RX ORDER — INSULIN GLARGINE 100 [IU]/ML
INJECTION, SOLUTION SUBCUTANEOUS
Qty: 5 ADJUSTABLE DOSE PRE-FILLED PEN SYRINGE | Refills: 3 | Status: SHIPPED | OUTPATIENT
Start: 2025-03-21

## 2025-03-21 NOTE — PROGRESS NOTES
lipids     Poor adherence to labs , follow ups.         EDUCATION:   Greater than 50% of this visit was spent in general counseling regarding obesity, diet, exercise, importance of adherence to insulin regime, recognition and treatment of hypo and hyperglycemia,  glucose logging, proper diabetes management, diabetic complications with poor management and the importance of glycemic control in order to avoid the complications of diabetes.    Risks and potential complications of diabetes were reviewed with the patient.  Diabetes health maintenance plan and follow-up were discussed and understood by the patient.  We reviewed the importance of medication compliance and regular follow-up.   Aggressive lifestyle modification was encouraged.     Exercise Counselling:  This patient is a candidate for regular physical exercise. Instructions to perform the following types of exercise:  Swimming or water aerobic exercise  Brisk walking  Playing tennis  Stationary bicycle or elliptical indoor  Low impact aerobic exercise    Instructions given to exercise for the following duration:  30 minutes a day for five-seven days per week.    Following instructions for being active throughout the day in addition to formal exercise:  Walk instead of drive whenever possible  Take the stairs instead of the elevator  Work in the garden  Park to the far end of the parking lot to add more walking steps to destination      Electronically signed by PATRICK JOHNSTON MD on 3/21/2025 at 11:33 AM

## 2025-03-23 ENCOUNTER — RESULTS FOLLOW-UP (OUTPATIENT)
Dept: ENDOCRINOLOGY | Age: 30
End: 2025-03-23

## 2025-03-23 DIAGNOSIS — E10.65 TYPE 1 DIABETES MELLITUS WITH HYPERGLYCEMIA (HCC): Primary | ICD-10-CM

## 2025-03-23 DIAGNOSIS — E66.01 TYPE 1 DIABETES MELLITUS WITH MORBID OBESITY: ICD-10-CM

## 2025-03-23 DIAGNOSIS — E10.69 TYPE 1 DIABETES MELLITUS WITH MORBID OBESITY: ICD-10-CM

## 2025-03-23 RX ORDER — ROSUVASTATIN CALCIUM 5 MG/1
5 TABLET, COATED ORAL NIGHTLY
Qty: 90 TABLET | Refills: 3 | Status: SHIPPED | OUTPATIENT
Start: 2025-03-23

## 2025-03-24 DIAGNOSIS — E10.65 TYPE 1 DIABETES MELLITUS WITH HYPERGLYCEMIA (HCC): ICD-10-CM

## 2025-03-24 RX ORDER — INSULIN LISPRO 100 [IU]/ML
INJECTION, SOLUTION INTRAVENOUS; SUBCUTANEOUS
Qty: 20 ADJUSTABLE DOSE PRE-FILLED PEN SYRINGE | Refills: 1 | Status: SHIPPED | OUTPATIENT
Start: 2025-03-24

## 2025-03-24 NOTE — TELEPHONE ENCOUNTER
Mr. Gonzalez informed of results:    Cholesterol numbers are very high . Please start crestor 5 mg daily. It can cause muscle pains.   A1C is very high at 10.1%. we need to bring it under 7%     He expressed understanding and had no further questions or concerns.

## 2025-03-24 NOTE — TELEPHONE ENCOUNTER
----- Message from Dr. Darío Tapia MD sent at 3/23/2025 11:51 AM EDT -----  Cholesterol numbers are very high . Please start crestor 5 mg daily. It can cause muscle pains.   A1C is very high at 10.1%. we need to bring it under 7%   Thanks,  Darío Tapia MD        < 137 < 115, HDL 59, Tgs 68 - March 2025

## 2025-05-29 DIAGNOSIS — E10.65 TYPE 1 DIABETES MELLITUS WITH HYPERGLYCEMIA (HCC): ICD-10-CM

## 2025-05-29 RX ORDER — INSULIN LISPRO 200 [IU]/ML
INJECTION, SOLUTION SUBCUTANEOUS
Qty: 20 ADJUSTABLE DOSE PRE-FILLED PEN SYRINGE | Refills: 0 | Status: SHIPPED | OUTPATIENT
Start: 2025-05-29

## 2025-05-29 NOTE — TELEPHONE ENCOUNTER
Requested Prescriptions     Pending Prescriptions Disp Refills    HUMALOG KWIKPEN 200 UNIT/ML SOPN pen [Pharmacy Med Name: HUMALOG 200 UNIT/ML KWIKPEN]  3     Sig: PLEASE PROVIDE 4 BOXES - MAX DAILY DOSE 65 UNITS PER DAY     Last refilled: 3/24/2025 # 20 pens with 1 refills    Lov:3/21/2025  Cx: 3/27/2025  Cx:4/9/2025  Nov: 6/25/2025

## 2025-06-25 ENCOUNTER — OFFICE VISIT (OUTPATIENT)
Dept: ENDOCRINOLOGY | Age: 30
End: 2025-06-25

## 2025-06-25 VITALS
SYSTOLIC BLOOD PRESSURE: 135 MMHG | BODY MASS INDEX: 31.93 KG/M2 | HEART RATE: 87 BPM | WEIGHT: 256.8 LBS | HEIGHT: 75 IN | DIASTOLIC BLOOD PRESSURE: 85 MMHG | OXYGEN SATURATION: 98 %

## 2025-06-25 DIAGNOSIS — E10.69 DYSLIPIDEMIA DUE TO TYPE 1 DIABETES MELLITUS (HCC): ICD-10-CM

## 2025-06-25 DIAGNOSIS — E10.69 TYPE 1 DIABETES MELLITUS WITH MORBID OBESITY (HCC): ICD-10-CM

## 2025-06-25 DIAGNOSIS — E10.65 TYPE 1 DIABETES MELLITUS WITH HYPERGLYCEMIA (HCC): Primary | ICD-10-CM

## 2025-06-25 DIAGNOSIS — E78.5 DYSLIPIDEMIA DUE TO TYPE 1 DIABETES MELLITUS (HCC): ICD-10-CM

## 2025-06-25 DIAGNOSIS — E66.01 TYPE 1 DIABETES MELLITUS WITH MORBID OBESITY (HCC): ICD-10-CM

## 2025-06-25 LAB — HBA1C MFR BLD: ABNORMAL %

## 2025-06-25 RX ORDER — INSULIN GLARGINE 100 [IU]/ML
INJECTION, SOLUTION SUBCUTANEOUS
Qty: 5 ADJUSTABLE DOSE PRE-FILLED PEN SYRINGE | Refills: 3 | Status: SHIPPED | OUTPATIENT
Start: 2025-06-25

## 2025-06-25 RX ORDER — INSULIN LISPRO 200 [IU]/ML
INJECTION, SOLUTION SUBCUTANEOUS
Qty: 20 ADJUSTABLE DOSE PRE-FILLED PEN SYRINGE | Refills: 0 | Status: SHIPPED | OUTPATIENT
Start: 2025-06-25

## 2025-06-25 NOTE — PATIENT INSTRUCTIONS
Change Basaglar to 27 units am (to avoid overnight hypoglycemias)    Humalog for coffee 5 units in am   Humalog I:CHO to 5 units for each 15 grams of carbs     Correction 2 units for every 50 above 150.

## 2025-06-25 NOTE — PROGRESS NOTES
Patient ID:   Wisam Gonzalez is a 30 y.o. male     Chief Complaint:   Wisam Gonzalez presents for an evaluation of Type 1 Diabetes Mellitus , Hyperlipidemia and hypertension.       Subjective:   Type 1 Diabetes Mellitus diagnosed at age 21.   On insulin since diagnosis   Moved from [place] late 2022 . I connected to  physicians but no data available today (not synced? )    Pumps are expensive . He checked in 2025 as well   Weight is stable     Basaglar 30 units daily in am. 38 units caused morning or overnight hypoglycemias) .missing once a week and then takes in the evening.    Humalog I:CHO 5 units for each 15 grams. Taking 30-40 < 50-60 units in a day  . Taking 3 units for coffee   Correction : 3 units for every 50 above 150       Feel comfortable counting carbs   Has glucagon at home     Using nany. 2 is cheaper than 3   Dexcom is expensive      Checks blood sugars 4 times per day. Reviewed/Reported  AM:   Lunch:  Supper:   HS:     Hypoglycemias: once a week. Sometimes in the afternoon and sometimes at night.     Meals: Three, may skip breakfast. Lunch and dinner are same size. Snacks 2 per day (granola bar, chips, pretzels). Drinks coke zero    Exercise: have not started yet     Denies chest pain, exertional dyspnea.     Family history of CAD: None   Denies smoking/ alcohol     The following portions of the patient's history were reviewed and updated as appropriate:       Family History   Problem Relation Age of Onset    No Known Problems Mother     No Known Problems Father     No Known Problems Sister     No Known Problems Sister     No Known Problems Sister     No Known Problems Sister     Breast Cancer Maternal Grandmother     Diabetes type 2  Paternal Grandmother     Prostate Cancer Paternal Grandfather           Social History     Socioeconomic History    Marital status: Unknown     Spouse name: Not on file    Number of children: Not on file    Years of education: Not on file    Highest education level: